# Patient Record
Sex: FEMALE
[De-identification: names, ages, dates, MRNs, and addresses within clinical notes are randomized per-mention and may not be internally consistent; named-entity substitution may affect disease eponyms.]

---

## 2024-02-05 ENCOUNTER — APPOINTMENT (OUTPATIENT)
Dept: OTOLARYNGOLOGY | Facility: CLINIC | Age: 64
End: 2024-02-05

## 2024-02-21 PROBLEM — Z00.00 ENCOUNTER FOR PREVENTIVE HEALTH EXAMINATION: Status: ACTIVE | Noted: 2024-02-21

## 2024-02-26 ENCOUNTER — APPOINTMENT (OUTPATIENT)
Dept: OTOLARYNGOLOGY | Facility: CLINIC | Age: 64
End: 2024-02-26
Payer: MEDICARE

## 2024-02-26 VITALS — BODY MASS INDEX: 27.66 KG/M2 | WEIGHT: 162 LBS | HEIGHT: 64 IN

## 2024-02-26 DIAGNOSIS — H93.291 OTHER ABNORMAL AUDITORY PERCEPTIONS, RIGHT EAR: ICD-10-CM

## 2024-02-26 DIAGNOSIS — H69.91 UNSPECIFIED EUSTACHIAN TUBE DISORDER, RIGHT EAR: ICD-10-CM

## 2024-02-26 PROCEDURE — 92567 TYMPANOMETRY: CPT

## 2024-02-26 PROCEDURE — 99203 OFFICE O/P NEW LOW 30 MIN: CPT | Mod: 25

## 2024-02-26 PROCEDURE — 92557 COMPREHENSIVE HEARING TEST: CPT

## 2024-02-26 PROCEDURE — 31231 NASAL ENDOSCOPY DX: CPT

## 2024-02-26 RX ORDER — ROSUVASTATIN CALCIUM 5 MG/1
5 TABLET, FILM COATED ORAL
Refills: 0 | Status: ACTIVE | COMMUNITY

## 2024-02-26 RX ORDER — EMPAGLIFLOZIN 25 MG/1
25 TABLET, FILM COATED ORAL
Refills: 0 | Status: ACTIVE | COMMUNITY

## 2024-02-26 RX ORDER — METFORMIN HYDROCHLORIDE 1000 MG/1
1000 TABLET, EXTENDED RELEASE ORAL
Refills: 0 | Status: ACTIVE | COMMUNITY

## 2024-02-26 RX ORDER — METHYLPREDNISOLONE 4 MG/1
4 TABLET ORAL
Qty: 1 | Refills: 0 | Status: ACTIVE | COMMUNITY
Start: 2024-02-26 | End: 1900-01-01

## 2024-02-26 RX ORDER — NIFEDIPINE 30 MG/1
30 TABLET, FILM COATED, EXTENDED RELEASE ORAL
Refills: 0 | Status: ACTIVE | COMMUNITY

## 2024-02-26 RX ORDER — LEVOTHYROXINE SODIUM 100 UG/1
100 CAPSULE ORAL
Refills: 0 | Status: ACTIVE | COMMUNITY

## 2024-02-26 RX ORDER — ASPIRIN 81 MG
81 TABLET, DELAYED RELEASE (ENTERIC COATED) ORAL
Refills: 0 | Status: ACTIVE | COMMUNITY

## 2024-02-26 NOTE — HISTORY OF PRESENT ILLNESS
[de-identified] : Patient comes in with recent nosebleeds, throughout the month of January. She has not had a nosebleeds in a few weeks. She did have a cold as well recently.  She noted that her hearing has been muffled since the last nosebleed in January. She only feels that the right ear is muffled. She does not have any ringing in the ear and dizziness.

## 2024-02-26 NOTE — END OF VISIT
[FreeTextEntry3] : I, Dr. Peres personally performed the evaluation and management (E/M) services including all necessary procedures, for this new patient. That E/M includes conducting the clinically appropriate initial history &/or exam, assessing all conditions, and establishing the plan of care. Today, my WILLIAM, Eileen Whitfield, was here to observe &/or participate in the visit & follow plan of care established by me.

## 2024-02-26 NOTE — ASSESSMENT
[FreeTextEntry1] : Patient on aspirin with recent nosebleeds the last 1 however was back in January she is now complaining of right ear feeling clogged and examination is no wax endoscopically she has a terribly deviated septum to the right this seems to be all consistent with eustachian tube dysfunction I normally would put her on Flonase but because of the recent nosebleeds I rather give her a Medrol Dosepak that will help her audiogram was performed confirm no evidence of any fluid or sensorineural hearing loss.  Fully expect her symptoms to resolve and respond to medical therapy.

## 2024-04-01 ENCOUNTER — APPOINTMENT (OUTPATIENT)
Dept: ORTHOPEDIC SURGERY | Facility: CLINIC | Age: 64
End: 2024-04-01
Payer: MEDICARE

## 2024-04-01 VITALS
SYSTOLIC BLOOD PRESSURE: 150 MMHG | WEIGHT: 160 LBS | DIASTOLIC BLOOD PRESSURE: 73 MMHG | BODY MASS INDEX: 27.31 KG/M2 | HEART RATE: 74 BPM | HEIGHT: 64 IN

## 2024-04-01 DIAGNOSIS — M47.817 SPONDYLOSIS W/OUT MYELOPATHY OR RADICULOPATHY, LUMBOSACRAL REGION: ICD-10-CM

## 2024-04-01 DIAGNOSIS — M54.50 LOW BACK PAIN, UNSPECIFIED: ICD-10-CM

## 2024-04-01 PROCEDURE — 99203 OFFICE O/P NEW LOW 30 MIN: CPT

## 2024-04-01 PROCEDURE — 72100 X-RAY EXAM L-S SPINE 2/3 VWS: CPT

## 2024-04-03 PROBLEM — M47.817 LUMBOSACRAL SPONDYLOSIS: Status: ACTIVE | Noted: 2024-04-03

## 2024-04-03 NOTE — HISTORY OF PRESENT ILLNESS
[de-identified] : Ms. KIMBERLEY SAMPSON  is a 63 year old female who presents with 2-3 months of low back pain.  Sometimes, she will get pain in both hamstrings.  Her pain is not improving despite taking Tylenol.   Normal bowel and bladder control.   Denies any recent fevers, chills, sweats, weight loss, or infection.  The patients past medical history, past surgical history, medications, allergies, and social history were reviewed by me today with the patient and documented accordingly.  In addition, the patient's family history, which is noncontributory to their visit, was also reviewed.

## 2024-04-03 NOTE — DISCUSSION/SUMMARY
[de-identified] : We discussed further treatment options.  She will try course of physical therapy.  MRI if not improved or worsen.

## 2024-04-03 NOTE — PHYSICAL EXAM
[de-identified] : Examination of the lumbar spine reveals no midline tenderness palpation, step-offs, or skin lesions. Decreased range of motion with respect to flexion, extension, lateral bending, and rotation. No tenderness to palpation of the sciatic notch. No tenderness palpation of the bilateral greater trochanters. No pain with passive internal/external rotation of the hips. No instability of bilateral lower extremities.  Negative ANCA. Negative straight leg raise bilaterally. No bowstring. Negative femoral stretch. 5 out of 5 iliopsoas, hip abductors, hips adductors, quadriceps, hamstrings, gastrocsoleus, tibialis anterior, extensor hallucis longus, peroneals. Grossly intact sensation to light touch bilateral lower extremities. 1+ patellar and Achilles reflexes. Downgoing Babinski. No clonus. Intact proprioception. Palpable pulses. No skin lesion and no edema on the right and left lower extremities. [de-identified] : AP lateral lumbar x-rays with some spondylosis without instability or aggressive lesions

## 2024-06-04 ENCOUNTER — APPOINTMENT (OUTPATIENT)
Dept: OTOLARYNGOLOGY | Facility: CLINIC | Age: 64
End: 2024-06-04
Payer: MEDICARE

## 2024-06-04 VITALS — TEMPERATURE: 98.1 F | HEART RATE: 81 BPM | SYSTOLIC BLOOD PRESSURE: 124 MMHG | DIASTOLIC BLOOD PRESSURE: 73 MMHG

## 2024-06-04 DIAGNOSIS — H61.21 IMPACTED CERUMEN, RIGHT EAR: ICD-10-CM

## 2024-06-04 DIAGNOSIS — L29.9 PRURITUS, UNSPECIFIED: ICD-10-CM

## 2024-06-04 DIAGNOSIS — R04.0 EPISTAXIS: ICD-10-CM

## 2024-06-04 PROCEDURE — 69210 REMOVE IMPACTED EAR WAX UNI: CPT

## 2024-06-04 PROCEDURE — 99213 OFFICE O/P EST LOW 20 MIN: CPT | Mod: 25

## 2024-06-04 PROCEDURE — 31231 NASAL ENDOSCOPY DX: CPT

## 2024-06-04 NOTE — HISTORY OF PRESENT ILLNESS
[de-identified] : Patient comes in with recent nosebleeds, throughout the month of January. She has not had a nosebleeds in a few weeks. She did have a cold as well recently.  She noted that her hearing has been muffled since the last nosebleed in January. She only feels that the right ear is muffled. She does not have any ringing in the ear and dizziness. [FreeTextEntry1] : Patient has not had any nosebleeds since the last visit. She does still get itching in both ears, no Qtip use. She denies changes in hearing since the last visit  She does not have any issues with ringing in the ears or dizziness. She does get occasional itching

## 2024-06-04 NOTE — ASSESSMENT
[FreeTextEntry1] : Patient follows up no further bleeding has some itchiness in her right ear wax curetted out endoscopically nasal cavities essentially healed no evidence of any vessels or bleeding potential follow-up with us annually.

## 2024-06-04 NOTE — PHYSICAL EXAM
[Nasal Endoscopy Performed] : nasal endoscopy was performed, see procedure section for findings [] : septum deviated to the right [Midline] : trachea located in midline position [Normal] : no rashes [de-identified] : small amount of cerumen removed from the right EAC, normal EAC otherwise

## 2024-06-04 NOTE — END OF VISIT
[FreeTextEntry3] : I, Dr. Peres personally performed the evaluation and management (E/M) services , including all procedures, for this established patient who presents today with (a) new problem(s)/exacerbation of (an) existing condition(s). That E/M includes conducting the clinically appropriate interval history &/or exam, assessing all new/exacerbated conditions, and establishing a new plan of care. Today, my WILLIAM, Eileen Whitfield, was here to observe &/or participate in the visit & follow plan of care established by me.